# Patient Record
Sex: MALE | Race: WHITE | NOT HISPANIC OR LATINO | URBAN - METROPOLITAN AREA
[De-identification: names, ages, dates, MRNs, and addresses within clinical notes are randomized per-mention and may not be internally consistent; named-entity substitution may affect disease eponyms.]

---

## 2017-05-15 ENCOUNTER — APPOINTMENT (RX ONLY)
Dept: URBAN - METROPOLITAN AREA CLINIC 77 | Facility: CLINIC | Age: 52
Setting detail: DERMATOLOGY
End: 2017-05-15

## 2017-05-15 DIAGNOSIS — L71.8 OTHER ROSACEA: ICD-10-CM

## 2017-05-15 DIAGNOSIS — L82.1 OTHER SEBORRHEIC KERATOSIS: ICD-10-CM

## 2017-05-15 PROCEDURE — 99214 OFFICE O/P EST MOD 30 MIN: CPT

## 2017-05-15 PROCEDURE — ? TREATMENT REGIMEN

## 2017-05-15 PROCEDURE — ? COUNSELING

## 2017-05-15 PROCEDURE — ? PRESCRIPTION

## 2017-05-15 RX ORDER — DOXYCYCLINE HYCLATE 200 MG/1
TABLET, DELAYED RELEASE ORAL
Qty: 60 | Refills: 4 | Status: ERX | COMMUNITY
Start: 2017-05-15

## 2017-05-15 RX ORDER — AZELAIC ACID 0.15 G/G
AEROSOL, FOAM TOPICAL
Qty: 1 | Refills: 4 | Status: ERX | COMMUNITY
Start: 2017-05-15

## 2017-05-15 RX ADMIN — DOXYCYCLINE HYCLATE: 200 TABLET, DELAYED RELEASE ORAL at 21:33

## 2017-05-15 RX ADMIN — AZELAIC ACID: 0.15 AEROSOL, FOAM TOPICAL at 21:47

## 2017-05-15 ASSESSMENT — LOCATION DETAILED DESCRIPTION DERM
LOCATION DETAILED: RIGHT CENTRAL MALAR CHEEK
LOCATION DETAILED: LEFT CENTRAL MALAR CHEEK

## 2017-05-15 ASSESSMENT — LOCATION ZONE DERM: LOCATION ZONE: FACE

## 2017-05-15 ASSESSMENT — LOCATION SIMPLE DESCRIPTION DERM
LOCATION SIMPLE: LEFT CHEEK
LOCATION SIMPLE: RIGHT CHEEK

## 2017-05-15 NOTE — PROCEDURE: TREATMENT REGIMEN
Samples Given: Rhofade
Detail Level: Zone
Plan: will continue Doryx 50mg-100mg titrating by the symptoms and Mirvaso\\nThe patient is happy with this regimen and states that Soolantra did not work very well\\n          ----We will start him on Finacea Foam to try to get a little bit better control of the bumps\\n\\nPatient states that last week, he had a painful Rosacea flare up on the tip of his nose.  States that he used a salicylic acid wash to help calm it down.  Discussed with patient that since every patient is a little different in how we treat the Rosacea, it would be ideal for him to increase the Doryx and take 200mg when he is having a moderate-severe flare up.  Discussed with patient that if he feels like the wash is working, he can continue to use it but he should be cautious since it can be very harsh on the skin and can exacerbate the condition.

## 2017-05-15 NOTE — PROCEDURE: COUNSELING
Oxycodone      Last Written Prescription Date:  02/27/17  Last Fill Quantity: 75,   # refills: 0  Last Office Visit with [facility], [facility] or [facility] prescribing provider: 02/27/17  Future Office visit:       Routing refill request to provider for review/approval because:  Drug not on [place] [facility] [facility] [place] [facility] [place] or controlled substance    CSA in epic. Rx to [facility] when available.   
Reason for Call:  Medication or medication refill:    Do you use a [facility]?  Name of the pharmacy and phone number for the current request:  [facility] [address] ([place]) - [phone]    Name of the medication requested: oxycodone    Other request: none    Can we leave a detailed message on this number? YES    Phone number patient can be reached at: Home number on file [phone] (home)    Best Time: any    Call taken on 3/27/2017 at 11:16 AM by Maki Delacruz      
Rx brought down to  pharmacy- pt advised to call pharmacy to make sure it has been process and ready for  before coming in.      
done  
Detail Level: Detailed

## 2017-05-17 ENCOUNTER — RX ONLY (OUTPATIENT)
Age: 52
Setting detail: RX ONLY
End: 2017-05-17

## 2017-05-17 RX ORDER — DOXYCYCLINE HYCLATE 50 MG/1
TABLET, DELAYED RELEASE ORAL
Qty: 60 | Refills: 4 | Status: ERX

## 2017-08-15 ENCOUNTER — APPOINTMENT (RX ONLY)
Dept: URBAN - METROPOLITAN AREA CLINIC 77 | Facility: CLINIC | Age: 52
Setting detail: DERMATOLOGY
End: 2017-08-15

## 2017-08-15 DIAGNOSIS — L71.8 OTHER ROSACEA: ICD-10-CM

## 2017-08-15 PROCEDURE — ? PRESCRIPTION

## 2017-08-15 PROCEDURE — 99214 OFFICE O/P EST MOD 30 MIN: CPT

## 2017-08-15 PROCEDURE — ? COUNSELING

## 2017-08-15 PROCEDURE — ? TREATMENT REGIMEN

## 2017-08-15 RX ORDER — IVERMECTIN 10 MG/G
CREAM TOPICAL
Qty: 1 | Refills: 5 | Status: ERX | COMMUNITY
Start: 2017-08-15

## 2017-08-15 RX ORDER — BRIMONIDINE TARTRATE 5 MG/G
GEL TOPICAL
Qty: 1 | Refills: 5 | Status: ERX | COMMUNITY
Start: 2017-08-15

## 2017-08-15 RX ORDER — OXYMETAZOLINE HYDROCHLORIDE 10 MG/G
CREAM TOPICAL
Qty: 1 | Refills: 5 | Status: ERX | COMMUNITY
Start: 2017-08-15

## 2017-08-15 RX ORDER — DOXYCYCLINE HYCLATE 50 MG/1
TABLET, DELAYED RELEASE ORAL
Qty: 120 | Refills: 5 | Status: ERX

## 2017-08-15 RX ADMIN — BRIMONIDINE TARTRATE: 5 GEL TOPICAL at 22:28

## 2017-08-15 RX ADMIN — IVERMECTIN: 10 CREAM TOPICAL at 22:28

## 2017-08-15 RX ADMIN — OXYMETAZOLINE HYDROCHLORIDE: 10 CREAM TOPICAL at 22:28

## 2017-08-15 NOTE — PROCEDURE: TREATMENT REGIMEN
Detail Level: Zone
Plan: Pt is here following up Rosacea---  Pt says majority of the time it's been stable but still gets flares from time to time \\nCurrently using Doryx 50 ( 100mg QD but when Pt is experiencing a flare up he uses 200mg) Soolantra QHS, mirvaso QAM\\n\\nDiscussed with him that it is an immune mediated condition that irritates the blood vessels and oil glands\\nEducated him on trigger factors such as stress, spicy foods, alcohol, sunlight, etc.\\nRecommend using sunscreen with zinc oxide to help prevent trigger factors from activating\\n\\nDiscussed with Pt that if his Rosacea is resistant to Rx's then our next step would be starting on accutane... we will follow up in 3 months to re-evaluate

## 2017-11-10 ENCOUNTER — APPOINTMENT (RX ONLY)
Dept: URBAN - METROPOLITAN AREA CLINIC 77 | Facility: CLINIC | Age: 52
Setting detail: DERMATOLOGY
End: 2017-11-10

## 2017-11-10 DIAGNOSIS — L71.8 OTHER ROSACEA: ICD-10-CM

## 2017-11-10 PROCEDURE — ? PRESCRIPTION

## 2017-11-10 PROCEDURE — ? COUNSELING

## 2017-11-10 PROCEDURE — 99213 OFFICE O/P EST LOW 20 MIN: CPT

## 2017-11-10 PROCEDURE — ? TREATMENT REGIMEN

## 2017-11-10 RX ORDER — FLUCONAZOLE 100 MG/1
TABLET ORAL
Qty: 24 | Refills: 0 | Status: ERX | COMMUNITY
Start: 2017-11-10

## 2017-11-10 RX ADMIN — FLUCONAZOLE: 100 TABLET ORAL at 18:33

## 2017-11-10 NOTE — PROCEDURE: TREATMENT REGIMEN
Plan: Location: face\\nTreatment: Continue Doryx 50 ( 100mg QD but when pt is experiencing a flare up he uses 150mg) Soolantra QHS, Rhofade QAM --- may start Accutane at f/u\\nPrescribe: fluconazole 100 mg tablet PO (Take 2 tablets at once weekly for 3 months)\\n\\nPt is here following up Rosacea.\\nPt is currently using Doryx 50 ( 100mg QD but when pt is experiencing a flare up he uses 150mg) Soolantra QHS, Rhofade QAM\\nPt states that the majority of the time it's been stable but still flares up from time to time with current treatmewnt regimen.\\n\\nPt discussed that he is starting to get really tired of having nodules form on his nose and wants further treatment if possible.\\nPt discussed nodules are very painful at times.\\n\\nDiscussed with him that it is an immune mediated condition that irritates the blood vessels and oil glands\\nEducated him on trigger factors such as stress, spicy foods, alcohol, sunlight, etc.\\nRecommend using sunscreen with zinc oxide to help prevent trigger factors from activating.\\n\\nDiscussed with pt that since his rosacea is resistant to rx's then our next step would be starting him on Accutane.\\n\\nDiscussed with pt that we will prescribe him fluconazole 100 mg tablet PO (Take 2 tablets at once weekly for 3 months) to help fight against yeast spores associated with rosacea.\\nDiscussed with pt to do more research on Accutane.\\nF/u in 2-3 months
Detail Level: Zone